# Patient Record
Sex: MALE | Race: WHITE | NOT HISPANIC OR LATINO
[De-identification: names, ages, dates, MRNs, and addresses within clinical notes are randomized per-mention and may not be internally consistent; named-entity substitution may affect disease eponyms.]

---

## 2023-01-04 ENCOUNTER — APPOINTMENT (OUTPATIENT)
Dept: UROLOGY | Facility: CLINIC | Age: 68
End: 2023-01-04
Payer: MEDICARE

## 2023-01-04 VITALS
BODY MASS INDEX: 38.3 KG/M2 | WEIGHT: 244 LBS | DIASTOLIC BLOOD PRESSURE: 111 MMHG | HEIGHT: 67 IN | HEART RATE: 85 BPM | SYSTOLIC BLOOD PRESSURE: 156 MMHG

## 2023-01-04 DIAGNOSIS — N48.0 LEUKOPLAKIA OF PENIS: ICD-10-CM

## 2023-01-04 DIAGNOSIS — N47.1 PHIMOSIS: ICD-10-CM

## 2023-01-04 PROBLEM — Z00.00 ENCOUNTER FOR PREVENTIVE HEALTH EXAMINATION: Status: ACTIVE | Noted: 2023-01-04

## 2023-01-04 PROCEDURE — 99204 OFFICE O/P NEW MOD 45 MIN: CPT

## 2023-01-04 NOTE — HISTORY OF PRESENT ILLNESS
[FreeTextEntry1] : Language: English\par Accompanied by: Self\par Contact info:  [phone]\par Referring Provider/PCP:  Dr. YOSEF Vega\par \par \par Initial H&P 01/04/2023:\par Mr. Simental is a very pleasant 67-year-old gentleman who presents today for initial evaluation of phimosis.\par \par He noticed tightening of the foreskin a few months ago, and it has been progressively getting tighter ever since.  Currently, he is unable to pull it back at all.  He has not had any infections as a result of it, however he does have soreness every once in a while.\par \par He denies spraying of his urine, however he does have a mildly weak stream.  It does not feel like it is being obstructed at the tip where the foreskin is tight.  He voids 1 time per night.  He denies hesitancy, intermittency, incomplete emptying, frequency, urgency, or gross hematuria.  He has never seen a urologist before.\par \par He has some issues with erections, and does not think they get from enough for penetration, but he is currently not interested in treatment as he is not sexually active.\par \par Of note, he has sinus issues and had a nosebleed recently, which was very prolonged and required urgent care visit (2/2 plavix), however resolved on its own once he arrived at urgent care.  Will address with his cardiologist next week. \par ---------------------------------------------------------------------------------------------------------------------------------------\par PMHx: CAD, Mild Obesity, Hypertension, hyperlipidemia, history of MI (~2015) s/p PCI w/ Stents\par PSHx: PCI w/ Stent (8y ago) still on Plavix, L TKR  \par SHx: Denies x3, retired - used to do various jobs within construction (plumbing, electric, carpentry, etc.), \par FHx: No known FHx  malignancy\par \par All: NKDA\par \par All non-urologic issues on ROS being addressed by PMD and caridologist.\par ---------------------------------------------------------------------------------------------------------------------------------------\par Physical Exam:\par General: NAD, sitting on exam table comfortably\par HEENT: NCAT, EOMI\par Resp: breathing comfortably on RA, b/l symm chest rise\par Cardiac: RRR\par Abd: SNTND\par Back: (-) CVAT b/l\par MSK: ART w/ FROM\par Psych: appropriate affect\par : uncircumcised phallus with very tight foreskin c/w phimosis, pale discoloration of foreskin c/f LS, b/l desc testes with no palpable masses or lesions\par \par ---------------------------------------------------------------------------------------------------------------------------------------\par Results:\par \par ---------------------------------------------------------------------------------------------------------------------------------------\par A/P: 67-year-old male with 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 Phimosis\par We discussed that the findings on his physical exam are consistent with lichen sclerosis.  I explained to him that this is an inflammatory condition that oftentimes causes phimosis and urethral stricture disease.  It has an  ascending pattern to it, meaning that it starts distally at the foreskin and then travels upwards to his glans, meatus, and the rest of his urethra.  If not addressed, it can progressively worsen and lead to urethral stricture disease.  To start, I will prescribe him triamcinolone cream twice daily for 6 weeks.  He was instructed to stretch his foreskin multiple times throughout the day including in the shower when the skin is soft, and as he applies the cream.  If the triamcinolone cream does not help, he will need to undergo circumcision.  We also discussed that if the triamcinolone cream works, and the phimosis resolves, it may recur again in the future and at that point, he would also need a circumcision.\par \par 2. BPH\par Although very mild, his weak stream is a sign of BPH.  We discussed that a majority of men at his age have a diagnosis of BPH, and the first-line treatment I recommend is tamsulosin.  At this time, he is not bothered enough by his 1 symptom to start treatment, however he understands that if it becomes worse, or if he changes his mind, he has many options at his disposal.  Additionally, he understands that BPH with obstructive symptoms can sometimes put him at risk for urinary tract infections, stone formation, and acute urinary retention.\par \par Additionally, he is going for knee surgery in the next few weeks at [facility].  I informed him that he should make the surgeon and nursing staff aware of his phimosis, although he likely will not require catheter placement at the time of his knee replacement.  He does understand, however, that if his procedure is done under spinal, he is at risk for urinary retention due to his BPH, and catheter placement may be more difficult because of the phimosis.  He does not require any urologic interventions prior to his orthopedic procedure, as a catheter may still be placed in the setting of phimosis.\par \par 3.  Erectile dysfunction\par He likely has erectile dysfunction due to a combination of vascular disease as well as his current blood pressure medication regimen.  At this time, he is not interested in treating his erectile dysfunction, however I did discuss with him that if he is interested in treating in the future, there are multiple options available to him, and I will be more than happy to address as needed. \par \par I will see him again in 6 weeks to reevaluate the phimosis.\par \par Plan:\par - Start Triamcinolone BID\par - RTC 6 weeks or sooner PRN

## 2023-01-04 NOTE — REVIEW OF SYSTEMS
[see HPI] : see HPI [Difficulty Walking] : difficulty walking [Easy Bleeding] : a tendency for easy bleeding [Negative] : Endocrine

## 2023-02-13 ENCOUNTER — APPOINTMENT (OUTPATIENT)
Dept: UROLOGY | Facility: CLINIC | Age: 68
End: 2023-02-13
Payer: MEDICARE

## 2023-02-13 VITALS
HEIGHT: 67 IN | SYSTOLIC BLOOD PRESSURE: 148 MMHG | DIASTOLIC BLOOD PRESSURE: 82 MMHG | BODY MASS INDEX: 37.98 KG/M2 | WEIGHT: 242 LBS

## 2023-02-13 PROCEDURE — 99214 OFFICE O/P EST MOD 30 MIN: CPT

## 2023-02-13 NOTE — HISTORY OF PRESENT ILLNESS
[FreeTextEntry1] : Language: English\par Accompanied by: Self\par Contact info:  [phone]\par Referring Provider/PCP:  Dr. YOSEF Vega\par \par \par Initial H&P 01/04/2023:\par Mr. Simental is a very pleasant 67-year-old gentleman who presents today for initial evaluation of phimosis.\par \par He noticed tightening of the foreskin a few months ago, and it has been progressively getting tighter ever since.  Currently, he is unable to pull it back at all.  He has not had any infections as a result of it, however he does have soreness every once in a while.\par \par He denies spraying of his urine, however he does have a mildly weak stream.  It does not feel like it is being obstructed at the tip where the foreskin is tight.  He voids 1 time per night.  He denies hesitancy, intermittency, incomplete emptying, frequency, urgency, or gross hematuria.  He has never seen a urologist before.\par \par He has some issues with erections, and does not think they get from enough for penetration, but he is currently not interested in treatment as he is not sexually active.\par \par Of note, he has sinus issues and had a nosebleed recently, which was very prolonged and required urgent care visit (2/2 plavix), however resolved on its own once he arrived at urgent care.  Will address with his cardiologist next week. \par \par Interval History 02/13/2023:\aliyah Presents today for f/u. Last seen clinic 01/04/2023.\par \aliyah Was prescribed triamcinolone cream for his phimosis.  States that he has not seen a difference. \par \par Opted for observation WRT to his BPH and ED.  \par \par No interval changes in health.  Has not scheduled his planned knee surgery yet.  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[employer], electric, [employer] etc.), \par FHx: No known FHx  malignancy\par \par All: NKDA\par \par All non-urologic issues on ROS being addressed by PMD and caridologist.\par ---------------------------------------------------------------------------------------------------------------------------------------\par Physical Exam:\par General: NAD, sitting on exam table comfortably\par HEENT: NCAT, EOMI\par Resp: breathing comfortably on RA, b/l symm chest rise\par Cardiac: RRR\par Abd: SNTND\par Back: (-) CVAT b/l\par MSK: CORDOVA w/ FROM\par Psych: appropriate affect\par : uncircumcised phallus with very tight foreskin c/w phimosis, pale discoloration of foreskin c/f LS, b/l desc testes with no palpable masses or lesions\par \par  (2/13/23): uncircumcised phallus with very tight foreskin c/w phimosis, prominent SP fat pad, exam unchanged from prior\par \par ---------------------------------------------------------------------------------------------------------------------------------------\par Results:\par \par ---------------------------------------------------------------------------------------------------------------------------------------\par A/P: 67-year-old male with 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 Phimosis\par After 6 weeks of steroid cream, his phimosis has not resolved.  We discussed that the neck step would be surgery.  I offered him to possible procedures: Circumcision vs Dorsal Slit procedure.  Given the possibility of lichen sclerosus, I would lean more towards circumcision.  We discussed that my only issue with circumcision is his prominent suprapubic fat pad could potentially complicate his circumcision and lead to a buried penis.  This would significantly affect his quality of life, and would require a more extensive reconstructive operation for revision.  By performing a dorsal slit, we would provide an adequate opening for his glans to allow for a more normal voiding pattern (if prostate not affecting it as well), however he would not remove the lichenoid skin.  At the time of the dorsal slit, I would biopsy the foreskin to confirm or rule out presence of lichen sclerosis, and if needed, a full circumcision could be performed at a later date.\par \par Patient would prefer a full circumcision.  Additional risks were discussed, including but not limited to bleeding, infection, damage to surrounding structures, wound dehiscence, hematoma formation, prolonged pain, and prolonged swelling.  He would need to hold Plavix for 7 days prior to surgery.\par \par Over the next 2 months, the patient has plans and family events, and he would like to hold off on surgery until May.\par \par He will see me at the end of April to discuss surgery once again and scheduling.\par \par He will also see his cardiologist in the interim and discuss surgical risks as well as holding Plavix.\par \par 2. BPH\par He continues to be only minimally bothered by his BPH, and therefore would like to hold off on tamsulosin.  We also discussed the possibility of postoperative retention following his knee surgery.  He informed me that he has not scheduled the knee surgery yet, and is holding off indefinitely for now.\par \par 3.  Erectile dysfunction\par Currently not interested in treating his erectile dysfunction.  He will let me know in the future if anything changes.\par \par I will see him again in April to discuss surgery\par \par Plan:\par - RTC April or sooner PRN

## 2023-04-17 ENCOUNTER — APPOINTMENT (OUTPATIENT)
Dept: UROLOGY | Facility: CLINIC | Age: 68
End: 2023-04-17
Payer: MEDICARE

## 2023-04-17 VITALS
HEIGHT: 67 IN | BODY MASS INDEX: 37.98 KG/M2 | SYSTOLIC BLOOD PRESSURE: 138 MMHG | WEIGHT: 242 LBS | DIASTOLIC BLOOD PRESSURE: 82 MMHG

## 2023-04-17 PROCEDURE — 99214 OFFICE O/P EST MOD 30 MIN: CPT

## 2023-04-17 NOTE — HISTORY OF PRESENT ILLNESS
[FreeTextEntry1] : Language: English\par Accompanied by: Self\par Contact info:  [phone]\par Referring Provider/PCP:  Dr. YOSEF Vega\par \par Initial H&P 01/04/2023:\par Mr. Simental is a very pleasant 67-year-old gentleman who presents today for initial evaluation of phimosis.\par \par He noticed tightening of the foreskin a few months ago, and it has been progressively getting tighter ever since.  Currently, he is unable to pull it back at all.  He has not had any infections as a result of it, however he does have soreness every once in a while.\par \par He denies spraying of his urine, however he does have a mildly weak stream.  It does not feel like it is being obstructed at the tip where the foreskin is tight.  He voids 1 time per night.  He denies hesitancy, intermittency, incomplete emptying, frequency, urgency, or gross hematuria.  He has never seen a urologist before.\par \par He has some issues with erections, and does not think they get from enough for penetration, but he is currently not interested in treatment as he is not sexually active.\par \par Of note, he has sinus issues and had a nosebleed recently, which was very prolonged and required urgent care visit (2/2 plavix), however resolved on its own once he arrived at urgent care.  Will address with his cardiologist next week. \par -------------------------------------------------------------------------------------------------------\par Interval History 04/17/2023:todd Presents today for f/u. Last seen clinic 02/13/2023:\aliyah brooks Was previously prescribed triamcinolone cream for his phimosis, and at his last visit, stated that he had not seen a difference. We discussed circumcision at his last visit, and he wanted to hold off until May due to upcoming family events.  Plan was for him to come in today to discuss circ again and schedule. \par \par Opted for observation WRT to his BPH and ED.  \par \par No interval changes in health.  Saw his cardiologist recently and is now off Plavix.  \par ---------------------------------------------------------------------------------------------------------------------------------------\par PMHx: CAD, Mild Obesity, Hypertension, hyperlipidemia, history of MI (~2015) s/p PCI w/ Stents\par PSHx: PCI w/ Stent (8y ago) still on Plavix, L TKR  \par SHx: Denies x3, retired - used to do various jobs within construction (plumbing, electric, carpentry, etc.), \par FHx: No known FHx  malignancy\par \par All: NKDA\par \par All non-urologic issues on ROS being addressed by PMD and caridologist.\par ---------------------------------------------------------------------------------------------------------------------------------------\par Physical Exam:\par General: NAD, sitting on exam table comfortably\par HEENT: NCAT, EOMI\par Resp: breathing comfortably on RA, b/l symm chest rise\par Cardiac: RRR\par Abd: SNTND\par Back: (-) CVAT b/l\par MSK: CORDOVA w/ FROM\par Psych: appropriate affect\par : uncircumcised phallus with very tight foreskin c/w phimosis, pale discoloration of foreskin c/f LS, b/l desc testes with no palpable masses or lesions\par \par  (2/13/23): uncircumcised phallus with very tight foreskin c/w phimosis, prominent SP fat pad, exam unchanged from prior\par \par ---------------------------------------------------------------------------------------------------------------------------------------\par Results:\par \par ---------------------------------------------------------------------------------------------------------------------------------------\par A/P: 67-year-old male with 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 Phimosis\par After 6 weeks of steroid cream, his phimosis has not resolved. \par \par I offered him two possible procedures: Circumcision vs Dorsal Slit procedure.  Given the possibility of lichen sclerosus, I would lean more towards circumcision.  We discussed that my only issue with circumcision is his prominent suprapubic fat pad could potentially complicate his circumcision and lead to a buried penis.  This would significantly affect his quality of life, and would require a more extensive reconstructive operation for revision.  By performing a dorsal slit, we would provide an adequate opening for his glans to allow for a more normal voiding pattern (if prostate not affecting it as well), however he would not remove the lichenoid skin.  At the time of the dorsal slit, I would biopsy the foreskin to confirm or rule out presence of lichen sclerosis, and if needed, a full circumcision could be performed at a later date.\par \par Patient would prefer a full circumcision.  Additional risks were discussed, including but not limited to bleeding, infection, damage to surrounding structures, wound dehiscence, hematoma formation, prolonged pain, and prolonged swelling. \par \par He wishes to proceed. Prefers 5/23/23 date. \par \par 2. BPH\par He continues to be only minimally bothered by his BPH, and therefore would like to hold off on tamsulosin.  We also discussed the possibility of postoperative retention following his knee surgery.  He informed me that he has not scheduled the knee surgery yet, and is holding off indefinitely for now.\par \par 3.  Erectile dysfunction\par Currently not interested in treating his erectile dysfunction.  He will let me know in the future if anything changes.\par \par \par Plan:\par - OR for circ

## 2023-04-21 ENCOUNTER — APPOINTMENT (OUTPATIENT)
Dept: UROLOGY | Facility: CLINIC | Age: 68
End: 2023-04-21

## 2023-05-18 ENCOUNTER — NON-APPOINTMENT (OUTPATIENT)
Age: 68
End: 2023-05-18

## 2023-05-18 ENCOUNTER — APPOINTMENT (OUTPATIENT)
Dept: HEART AND VASCULAR | Facility: CLINIC | Age: 68
End: 2023-05-18
Payer: MEDICARE

## 2023-05-18 VITALS
HEART RATE: 80 BPM | BODY MASS INDEX: 38.61 KG/M2 | SYSTOLIC BLOOD PRESSURE: 140 MMHG | HEIGHT: 67 IN | WEIGHT: 246 LBS | DIASTOLIC BLOOD PRESSURE: 90 MMHG | RESPIRATION RATE: 12 BRPM

## 2023-05-18 DIAGNOSIS — Z98.61 ATHEROSCLEROTIC HEART DISEASE OF NATIVE CORONARY ARTERY W/OUT ANGINA PECTORIS: ICD-10-CM

## 2023-05-18 DIAGNOSIS — Z01.810 ENCOUNTER FOR PREPROCEDURAL CARDIOVASCULAR EXAMINATION: ICD-10-CM

## 2023-05-18 DIAGNOSIS — Z78.9 OTHER SPECIFIED HEALTH STATUS: ICD-10-CM

## 2023-05-18 DIAGNOSIS — I25.10 ATHEROSCLEROTIC HEART DISEASE OF NATIVE CORONARY ARTERY W/OUT ANGINA PECTORIS: ICD-10-CM

## 2023-05-18 PROCEDURE — 99204 OFFICE O/P NEW MOD 45 MIN: CPT

## 2023-05-18 PROCEDURE — 93000 ELECTROCARDIOGRAM COMPLETE: CPT

## 2023-05-18 RX ORDER — ATORVASTATIN CALCIUM 80 MG/1
80 TABLET, FILM COATED ORAL
Refills: 0 | Status: ACTIVE | COMMUNITY

## 2023-05-18 RX ORDER — METOPROLOL SUCCINATE 50 MG/1
50 TABLET, EXTENDED RELEASE ORAL
Refills: 0 | Status: ACTIVE | COMMUNITY

## 2023-05-18 RX ORDER — LISINOPRIL 10 MG/1
10 TABLET ORAL
Refills: 0 | Status: ACTIVE | COMMUNITY

## 2023-05-18 RX ORDER — ASPIRIN 81 MG
81 TABLET, DELAYED RELEASE (ENTERIC COATED) ORAL
Refills: 0 | Status: ACTIVE | COMMUNITY

## 2023-05-18 RX ORDER — EZETIMIBE 10 MG/1
10 TABLET ORAL
Refills: 0 | Status: ACTIVE | COMMUNITY

## 2023-05-18 NOTE — DISCUSSION/SUMMARY
[Procedure Low Risk] : the procedure risk is low [Patient Intermediate Risk] : the patient is an intermediate risk [Optimized for Surgery] : the patient is optimized for surgery [As per surgery] : as per surgery [Continue] : Continue medications as currently directed [FreeTextEntry1] : 1.  Preop clearance for elective circumcision secondary to phimosis: Patient is intermediate risk for low risk procedure does greater than 4 METS of daily activity without chest pain shortness of breath PND orthopnea.  Reviewed recent echocardiogram will obtain preop blood work and EKG.  Patient is optimized for planned procedure and may proceed as planned.  Continue perioperative beta-blocker\par 2.  CAD: Continue current meds.

## 2023-05-18 NOTE — HISTORY OF PRESENT ILLNESS
[Preoperative Visit] : for a medical evaluation prior to surgery [Scheduled Procedure ___] : a [unfilled] [Chest Pain] : no chest pain [Dyspnea] : no dyspnea [Lower Extremity Swelling] : no lower extremity swelling [Metabolic Capacity ___Mets%] : The patient has a metabolic capacity of [unfilled] Mets%  [Good] : Good [FreeTextEntry1] : 67-year-old male with a past medical history of CAD status post multivessel PCI in the setting of an MI in 2013.  Since then patient has been feeling well denies chest pain shortness of breath PND orthopnea does greater than 4 METS of daily activity without symptoms.  Underwent a echocardiogram in March 2023 which revealed an ejection fraction of 44% mild valvular heart disease.

## 2023-05-18 NOTE — PHYSICAL EXAM
[General Appearance - Well Developed] : well developed [Normal Appearance] : normal appearance [Well Groomed] : well groomed [General Appearance - Well Nourished] : well nourished [No Deformities] : no deformities [General Appearance - In No Acute Distress] : no acute distress [Normal Oral Mucosa] : normal oral mucosa [No Oral Pallor] : no oral pallor [No Oral Cyanosis] : no oral cyanosis [Normal Jugular Venous A Waves Present] : normal jugular venous A waves present [Normal Jugular Venous V Waves Present] : normal jugular venous V waves present [No Jugular Venous Rizzo A Waves] : no jugular venous rizzo A waves [Respiration, Rhythm And Depth] : normal respiratory rhythm and effort [Exaggerated Use Of Accessory Muscles For Inspiration] : no accessory muscle use [Auscultation Breath Sounds / Voice Sounds] : lungs were clear to auscultation bilaterally [Heart Rate And Rhythm] : heart rate and rhythm were normal [Heart Sounds] : normal S1 and S2 [Arterial Pulses Normal] : the arterial pulses were normal [Edema] : no peripheral edema present [Abdomen Soft] : soft [Abdomen Tenderness] : non-tender [Abdomen Mass (___ Cm)] : no abdominal mass palpated [Abnormal Walk] : normal gait [Gait - Sufficient For Exercise Testing] : the gait was sufficient for exercise testing [Nail Clubbing] : no clubbing of the fingernails [Cyanosis, Localized] : no localized cyanosis [Petechial Hemorrhages (___cm)] : no petechial hemorrhages [] : no ischemic changes [Oriented To Time, Place, And Person] : oriented to person, place, and time [Affect] : the affect was normal [Mood] : the mood was normal [No Anxiety] : not feeling anxious

## 2023-05-19 LAB
ALBUMIN SERPL ELPH-MCNC: 4.5 G/DL
ALP BLD-CCNC: 91 U/L
ALT SERPL-CCNC: 24 U/L
ANION GAP SERPL CALC-SCNC: 11 MMOL/L
APTT BLD: 31.2 SEC
AST SERPL-CCNC: 23 U/L
BILIRUB SERPL-MCNC: 0.9 MG/DL
BUN SERPL-MCNC: 20 MG/DL
CALCIUM SERPL-MCNC: 10.2 MG/DL
CHLORIDE SERPL-SCNC: 105 MMOL/L
CO2 SERPL-SCNC: 27 MMOL/L
CREAT SERPL-MCNC: 1.07 MG/DL
EGFR: 77 ML/MIN/1.73M2
GLUCOSE SERPL-MCNC: 99 MG/DL
INR PPP: 1.03 RATIO
POTASSIUM SERPL-SCNC: 4.8 MMOL/L
PROT SERPL-MCNC: 7.3 G/DL
PT BLD: 12 SEC
SODIUM SERPL-SCNC: 143 MMOL/L

## 2023-05-22 NOTE — ASU PATIENT PROFILE, ADULT - NSICDXPASTMEDICALHX_GEN_ALL_CORE_FT
PAST MEDICAL HISTORY:  Elevated cholesterol     History of heart attack 10 yrs ago    HTN (hypertension)

## 2023-05-22 NOTE — ASU PATIENT PROFILE, ADULT - NSICDXPASTSURGICALHX_GEN_ALL_CORE_FT
PAST SURGICAL HISTORY:  History of left knee replacement     S/P coronary artery stent placement x4 10 yrs ago

## 2023-05-22 NOTE — ASU PATIENT PROFILE, ADULT - NS PREOP UNDERSTANDS INFO
no solids after 10pm, clears allowed up to 6am, bring ID and insurance card, no valuables or jewelry, wear comfortable clothing../yes No solid food/candy/dairy/gum after midnight Monday, water before 09:30am Tuesday, Patient to come with photo ID/Insurance card, dress in comfortable clothes, no jewelries/valuables/contact lens, no smoking/alcohol/recreational drug use on Monday, address and callback number was given./yes

## 2023-05-22 NOTE — ASU PATIENT PROFILE, ADULT - FALL HARM RISK - UNIVERSAL INTERVENTIONS
Bed in lowest position, wheels locked, appropriate side rails in place/Call bell, personal items and telephone in reach/Instruct patient to call for assistance before getting out of bed or chair/Non-slip footwear when patient is out of bed/[place] to call system/Physically safe environment - no spills, clutter or unnecessary equipment/Purposeful Proactive Rounding/Room/bathroom lighting operational, light cord in reach

## 2023-05-23 ENCOUNTER — APPOINTMENT (OUTPATIENT)
Dept: UROLOGY | Facility: AMBULATORY SURGERY CENTER | Age: 68
End: 2023-05-23

## 2023-05-25 PROBLEM — I25.2 OLD MYOCARDIAL INFARCTION: Chronic | Status: ACTIVE | Noted: 2023-05-22

## 2023-05-25 PROBLEM — E78.00 PURE HYPERCHOLESTEROLEMIA, UNSPECIFIED: Chronic | Status: ACTIVE | Noted: 2023-05-22

## 2023-05-29 ENCOUNTER — TRANSCRIPTION ENCOUNTER (OUTPATIENT)
Age: 68
End: 2023-05-29

## 2023-05-30 ENCOUNTER — TRANSCRIPTION ENCOUNTER (OUTPATIENT)
Age: 68
End: 2023-05-30

## 2023-05-30 ENCOUNTER — RESULT REVIEW (OUTPATIENT)
Age: 68
End: 2023-05-30

## 2023-05-30 ENCOUNTER — OUTPATIENT (OUTPATIENT)
Dept: OUTPATIENT SERVICES | Facility: HOSPITAL | Age: 68
LOS: 1 days | Discharge: HOME | End: 2023-05-30
Payer: MEDICARE

## 2023-05-30 ENCOUNTER — APPOINTMENT (OUTPATIENT)
Dept: UROLOGY | Facility: AMBULATORY SURGERY CENTER | Age: 68
End: 2023-05-30

## 2023-05-30 VITALS
HEIGHT: 67 IN | TEMPERATURE: 98 F | HEART RATE: 72 BPM | RESPIRATION RATE: 17 BRPM | OXYGEN SATURATION: 95 % | WEIGHT: 242.51 LBS | SYSTOLIC BLOOD PRESSURE: 139 MMHG | DIASTOLIC BLOOD PRESSURE: 92 MMHG

## 2023-05-30 VITALS
TEMPERATURE: 97 F | OXYGEN SATURATION: 97 % | RESPIRATION RATE: 16 BRPM | HEART RATE: 75 BPM | SYSTOLIC BLOOD PRESSURE: 126 MMHG | DIASTOLIC BLOOD PRESSURE: 82 MMHG

## 2023-05-30 DIAGNOSIS — Z95.5 PRESENCE OF CORONARY ANGIOPLASTY IMPLANT AND GRAFT: Chronic | ICD-10-CM

## 2023-05-30 DIAGNOSIS — Z96.652 PRESENCE OF LEFT ARTIFICIAL KNEE JOINT: Chronic | ICD-10-CM

## 2023-05-30 PROCEDURE — 88304 TISSUE EXAM BY PATHOLOGIST: CPT | Mod: 26

## 2023-05-30 PROCEDURE — 54161 CIRCUM 28 DAYS OR OLDER: CPT

## 2023-05-30 RX ORDER — METOPROLOL TARTRATE 50 MG
1 TABLET ORAL
Refills: 0 | DISCHARGE

## 2023-05-30 RX ORDER — SODIUM CHLORIDE 9 MG/ML
1000 INJECTION, SOLUTION INTRAVENOUS
Refills: 0 | Status: DISCONTINUED | OUTPATIENT
Start: 2023-05-30 | End: 2023-05-30

## 2023-05-30 RX ORDER — ACETAMINOPHEN 500 MG
1000 TABLET ORAL ONCE
Refills: 0 | Status: COMPLETED | OUTPATIENT
Start: 2023-05-30 | End: 2023-05-30

## 2023-05-30 RX ORDER — ATORVASTATIN CALCIUM 80 MG/1
1 TABLET, FILM COATED ORAL
Refills: 0 | DISCHARGE

## 2023-05-30 RX ORDER — LISINOPRIL 2.5 MG/1
1 TABLET ORAL
Refills: 0 | DISCHARGE

## 2023-05-30 RX ORDER — CELECOXIB 200 MG/1
1 CAPSULE ORAL
Qty: 14 | Refills: 0
Start: 2023-05-30 | End: 2023-06-05

## 2023-05-30 RX ORDER — FENTANYL CITRATE 50 UG/ML
25 INJECTION INTRAVENOUS
Refills: 0 | Status: DISCONTINUED | OUTPATIENT
Start: 2023-05-30 | End: 2023-05-30

## 2023-05-30 RX ORDER — EZETIMIBE 10 MG/1
1 TABLET ORAL
Refills: 0 | DISCHARGE

## 2023-05-30 RX ORDER — ASPIRIN/CALCIUM CARB/MAGNESIUM 324 MG
1 TABLET ORAL
Refills: 0 | DISCHARGE

## 2023-05-30 RX ORDER — ONDANSETRON 8 MG/1
4 TABLET, FILM COATED ORAL ONCE
Refills: 0 | Status: DISCONTINUED | OUTPATIENT
Start: 2023-05-30 | End: 2023-05-30

## 2023-05-30 RX ADMIN — Medication 1000 MILLIGRAM(S): at 11:37

## 2023-05-30 NOTE — ASU DISCHARGE PLAN (ADULT/PEDIATRIC) - ASU DC SPECIAL INSTRUCTIONSFT
CIRCUMCISION    STITCHES: The stitches in the incision will dissolve and fall out by themselves. Sometimes skin stitches may open, allowing a slight gaping of the incision. This is no problem if you keep the area clean. You may apply Bacitracin (available over the counter) to the incision 3 times a day for the first week. Keep your penis wrapped in the dressing for 21 hours on, 3 hours off, each day. Any “black and blue” bruising areas are expected and will also resolve over weeks.  You may apply an ice-pack for 15 minutes out of every hour for the first 24 -36 hours to minimize pain and swelling.    PAIN: You may have some intermittent pain for up to six (6) weeks post operatively. Pain does not signify any problem unless associated with fever, chills, or inability to void.  If you experience any fevers or chills please call immediately as this may be signs of an infection. You may take Tylenol (acetaminophen) 1000mg available over the counter, for pain every 6 hours as needed. Do not exceed 4000mg of Tylenol (acetaminophen) daily. You may also take Celebrex 100mg twice daily - for your convenience, all prescriptions are sent to [facility] pharmacy at [address] [place] [address], on the corner of [address] and [address] [place] [address].    STOOL SOFTENERS: Do not allow yourself to become constipated as straining may cause bleeding. Take stool softeners or a laxative (ex. Miralax, Colace, Senokot, ExLax, etc), available over the counter, if taking Percocet.    ANTICOAGULATION: If you are taking any blood thinning medications, please discuss with your urologist prior to restarting these medications unless otherwise specified.    BATHING: You may sponge bath 24 hours after surgery, but minimize water to the surgical incision and drain.    DIET: You may resume your regular diet and regular medication regimen.    ACTIVITY: No heavy lifting or strenuous exercise until you are evaluated at your post-operative appointment. Otherwise, you may return to your usual level of physical activity.    FOLLOW-UP: Please follow up with Dr. Hilton. If you did not already schedule your post-operative appointment, please call your urologist to schedule and follow-up appointment.    CALL YOUR UROLOGIST IF: You have any bleeding that does not stop, inability to void >8 hours, fever over 100.4 F, chills, persistent nausea/vomiting, changes in your incision concerning for infection, or if your pain is not controlled on your discharge pain medications.
Detail Level: Detailed
Plan: This patient has been treated today with image guided superficial radiation therapy for non-melanoma skin cancer.\\n\\nWritten informed consent has been previously obtained from this patient for this treatment. This consent is documented in the patient’s chart. The patient gave verbal consent to continue treatment today.\\n\\nThe patient was treated with a specific radiation dose and setup as prescribed by the provider listed on this visit note. A Radiation Therapist performed administration of radiation under supervision of provider. The treatment parameters and cumulative dose are indicated above.\\n\\nPrior to administering the radiation, the patient underwent a verification therapeutic radiology simulation-aided field setting defining relevant normal and abnormal target anatomy and acquiring images with high frequency ultrasound in addition to data necessary developing optimal radiation treatment process for the patient. This process includes verification of the treatment port(s) and proper treatment positioning. All treatment ports were photographed within electronic medical record. The patient’s customized lead blocking along with gross tumor volume and margin was confirmed. Considering superficial radiotherapy is clinical in setup, this requires physician and radiation therapist to clarify location interest being treated against initial images, pathology and patient anatomy. Care was taken ensuring fields treated were geometrically accurate and properly positioned using therapeutic radiology simulation-aided field setting verification per fraction. This process is also utilized to determine if any prescription or setup changes are necessary. These steps are therefore medically necessary ensuring safe and effective administration of radiation. Ongoing therapeutic radiology simulation-aided field setting verification is ordered throughout course of therapy.\\n\\nA high frequency ultrasound image was acquired today for two-dimensional evaluation of the tumor volume and response to treatment, in addition to geometric accuracy of field placement. US depth is 2.41 mm, which is 0.58 mm in difference from previous imaging. The field placement and ultrasound imaging, per fraction, is separate and distinct from the initial simulation, and is an important task in providing safe administration of superficial radiation therapy. Physician evaluation of the ultrasound tumor depth will be ongoing throughout the course of treatment and is deemed medically necessary in order to ensure the efficacy of treatment and any necessary changes. Today’s image was evaluated for determination of continuation of treatment with the current plan or with necessary changes as appropriate. According to provider review of verification, therapeutic radiology simulation-aided field setting and imaging, no change is required. Additionally the use of ultrasound visualization and targeted assessment allows the patient to be able to see their cancer(s) progress, encouraging patient to complete and maintain compliance through full course of radiotherapy.\\n\\nPer Dr. Chen, continued ultrasound guidance and therapeutic radiology simulation-aided field setting verification per fraction is required for field placement, measurement of tumor depth, progress and acute effect monitoring.

## 2023-05-30 NOTE — ASU DISCHARGE PLAN (ADULT/PEDIATRIC) - CARE PROVIDER_API CALL
Paul Hilton  Urology  [address] [place] [address]  Phone: [phone]  Fax: [phone]  Follow Up Time:

## 2023-05-31 ENCOUNTER — NON-APPOINTMENT (OUTPATIENT)
Age: 68
End: 2023-05-31

## 2023-05-31 PROBLEM — I10 ESSENTIAL (PRIMARY) HYPERTENSION: Chronic | Status: ACTIVE | Noted: 2023-05-22

## 2023-06-05 ENCOUNTER — NON-APPOINTMENT (OUTPATIENT)
Age: 68
End: 2023-06-05

## 2023-06-14 ENCOUNTER — APPOINTMENT (OUTPATIENT)
Dept: UROLOGY | Facility: CLINIC | Age: 68
End: 2023-06-14
Payer: COMMERCIAL

## 2023-06-14 VITALS
SYSTOLIC BLOOD PRESSURE: 160 MMHG | HEART RATE: 76 BPM | DIASTOLIC BLOOD PRESSURE: 100 MMHG | BODY MASS INDEX: 37.2 KG/M2 | WEIGHT: 237 LBS | OXYGEN SATURATION: 7 % | HEIGHT: 67 IN

## 2023-06-14 PROCEDURE — 99213 OFFICE O/P EST LOW 20 MIN: CPT

## 2023-06-21 LAB — SURGICAL PATHOLOGY STUDY: SIGNIFICANT CHANGE UP

## 2023-07-03 NOTE — HISTORY OF PRESENT ILLNESS
[FreeTextEntry1] : Language: English\par Accompanied by: Self\par Contact info:  [phone]\par Referring Provider/PCP:  Dr. YOSEF Vega\par \par Initial H&P 01/04/2023:\par Mr. Simental is a very pleasant 67-year-old gentleman who presents today for initial evaluation of phimosis.\par \par He noticed tightening of the foreskin a few months ago, and it has been progressively getting tighter ever since.  Currently, he is unable to pull it back at all.  He has not had any infections as a result of it, however he does have soreness every once in a while.\par \par He denies spraying of his urine, however he does have a mildly weak stream.  It does not feel like it is being obstructed at the tip where the foreskin is tight.  He voids 1 time per night.  He denies hesitancy, intermittency, incomplete emptying, frequency, urgency, or gross hematuria.  He has never seen a urologist before.\par \par He has some issues with erections, and does not think they get from enough for penetration, but he is currently not interested in treatment as he is not sexually active.\par \par Of note, he has sinus issues and had a nosebleed recently, which was very prolonged and required urgent care visit (2/2 plavix), however resolved on its own once he arrived at urgent care.  Will address with his cardiologist next week. \par -------------------------------------------------------------------------------------------------------\par 05/30/2023: s/p Circumcision\par Findings: extremely phimotic foreskin with lichenoid appearance, glans was pale/discolored c/w lichen sclerosis, meatus was stenotic as well, significant skin bridges/adhesions between the foreskin and glans\par -------------------------------------------------------------------------------------------------------\par Interval History 06/14/2023:todd Presents today for post op visit.  POD15 from procedure above. \par \par Overall doing well since surgery.  Does have significant penile swelling.  Wrapped penis for 1 week as previously instructed. Denies erythema/purulent drainage from surgical site.  Only other complaint is occasional spraying/feeling of pressure at the tip when he voids. \par \par He previously called and requested urgent f/u, however on the day of his planned visit, he felt reassured by our conversation and elected to f/u at his already scheduled visit today. \par \par ---------------------------------------------------------------------------------------------------------------------------------------\par PMHx: CAD, Mild Obesity, Hypertension, hyperlipidemia, history of MI (~2015) s/p PCI w/ Stents\par PSHx: PCI w/ Stent (8y ago) still on Plavix, L TKR  \par SHx: Denies x3, retired - used to do various jobs within construction (plumbing, electric, carpentry, etc.), \par FHx: No known FHx  malignancy\par \par All: NKDA\par \par All non-urologic issues on ROS being addressed by PMD and caridologist.\par ---------------------------------------------------------------------------------------------------------------------------------------\par Physical Exam:\par General: NAD, sitting on exam table comfortably\par HEENT: NCAT, EOMI\par Resp: breathing comfortably on RA, b/l symm chest rise\par Cardiac: RRR\par Abd: SNTND\par Back: (-) CVAT b/l\par MSK: CORDOVA w/ FROM\par Psych: appropriate affect\par : uncircumcised phallus with very tight foreskin c/w phimosis, pale discoloration of foreskin c/f LS, b/l desc testes with no palpable masses or lesions\par \par  (2/13/23): uncircumcised phallus with very tight foreskin c/w phimosis, prominent SP fat pad, exam unchanged from prior\par  (6/14/23): edematous shaft, circumcision site healing well, evidence of new skin bridge forming between the glans and the more proximal skin, very stenotic meatus, no erythema or purulent drainage appreciated. \par ---------------------------------------------------------------------------------------------------------------------------------------\par Results:\par \par ---------------------------------------------------------------------------------------------------------------------------------------\par A/P: 67-year-old male with phimosis secondary to lichen sclerosis, BPH, and erectile dysfunction. He is now POD15 from procedure above. \par \par 1. Phimosis\par I am overall pleased with the appearance of his surgical site and the healing process thus far.  I explained that the swelling he has, that is more concentrated in the distal shaft, is expected at this stage in the postoperative course.  He was previously instructed to stop bacitracin, and as a form of barrier cream, he may start using aquaphor to help facilitate healing and minimize scarring. \par \par He may have a new skin bridge forming along the lateral aspect of his glans, which will need to be reassessed when the edema has improved.  He was informed that by continuous application of the ointment, he will prevent the skin edges from coming into contact with one another, thereby decreasing the risk of adhesions/bridges. \par \par 2. Meatal Stenosis\par I discussed the intraoperative findings with him on POD1 (see chart note) as well as today. He understands that the meatal stenosis is a consequence of his lichen sclerosis, and it was not something that was able to be identified on his pre op visits, as the foreskin was completely occluding his meatus.  \par \par Now that his meatus is exposed, he will require additional workup prior to meatoplasty, in the form of a RUG.  I explained to him that based on his symptoms, he likely has disease in the meatus only, however one cannot determine this without an official RUG.  \par 3. BPH\par He continues to be only minimally bothered by his BPH, and therefore would like to hold off on tamsulosin.  \par \par 4.  Erectile dysfunction\par Currently not interested in treating his erectile dysfunction.  He will let me know in the future if anything changes.\par \par Al questions were answered on today's visit.  I will see him again in 4 weeks for reassessment and discussion of next steps. \par \par Plan:\par - cont ointment to surgical site 2-3x/day\par - RTC 4 weeks or sooner PRN

## 2023-07-10 ENCOUNTER — APPOINTMENT (OUTPATIENT)
Dept: UROLOGY | Facility: CLINIC | Age: 68
End: 2023-07-10

## 2023-07-14 ENCOUNTER — APPOINTMENT (OUTPATIENT)
Dept: UROLOGY | Facility: CLINIC | Age: 68
End: 2023-07-14
Payer: MEDICARE

## 2023-07-14 VITALS
RESPIRATION RATE: 12 BRPM | DIASTOLIC BLOOD PRESSURE: 90 MMHG | BODY MASS INDEX: 37.35 KG/M2 | WEIGHT: 238 LBS | HEIGHT: 67 IN | SYSTOLIC BLOOD PRESSURE: 140 MMHG

## 2023-07-14 PROCEDURE — 99213 OFFICE O/P EST LOW 20 MIN: CPT

## 2023-07-14 RX ORDER — BETAMETHASONE DIPROPIONATE 0.5 MG/G
0.05 CREAM TOPICAL TWICE DAILY
Qty: 1 | Refills: 1 | Status: ACTIVE | COMMUNITY
Start: 2023-07-14 | End: 1900-01-01

## 2023-07-14 NOTE — HISTORY OF PRESENT ILLNESS
[FreeTextEntry1] : Language: English\par Accompanied by: Self\par Contact info:  [phone]\par Referring Provider/PCP:  Dr. YOSEF Vega\par \par Initial H&P 01/04/2023:\par Mr. Simental is a very pleasant 67-year-old gentleman who presents today for initial evaluation of phimosis.\par \par He noticed tightening of the foreskin a few months ago, and it has been progressively getting tighter ever since.  Currently, he is unable to pull it back at all.  He has not had any infections as a result of it, however he does have soreness every once in a while.\par \par He denies spraying of his urine, however he does have a mildly weak stream.  It does not feel like it is being obstructed at the tip where the foreskin is tight.  He voids 1 time per night.  He denies hesitancy, intermittency, incomplete emptying, frequency, urgency, or gross hematuria.  He has never seen a urologist before.\par \par He has some issues with erections, and does not think they get from enough for penetration, but he is currently not interested in treatment as he is not sexually active.\par \par Of note, he has sinus issues and had a nosebleed recently, which was very prolonged and required urgent care visit (2/2 plavix), however resolved on its own once he arrived at urgent care.  Will address with his cardiologist next week. \par -------------------------------------------------------------------------------------------------------\par 05/30/2023: s/p Circumcision\par Findings: extremely phimotic foreskin with lichenoid appearance, glans was pale/discolored c/w lichen sclerosis, meatus was stenotic as well, significant skin bridges/adhesions between the foreskin and glans\par -------------------------------------------------------------------------------------------------------\par Interval History 07/14/2023:todd Presents today for post op visit.  Last seen in clinic on 06/14/2023. He is now 6 weeks out from above procedure. No interval changes in health.  \par \par At his last visit, he still had significant penile swelling.  Wrapped penis for 1 week as previously instructed. Denied erythema/purulent drainage from surgical site.  Only other complaint was occasional spraying/feeling of pressure at the tip when he voids. \par \par On exam, he had moderate edema, evidence of new skin bridge formation and meatal stenosis. \par \par Today, he states that his penis looks better.  States that left side is still a little "puffy/funky looking."  \par \par No longer having pain with voiding, but still experiencing spraying/crooked stream. \par ---------------------------------------------------------------------------------------------------------------------------------------\par PMHx: CAD, Mild Obesity, Hypertension, hyperlipidemia, history of MI (~2015) s/p PCI w/ Stents\par PSHx: PCI w/ Stent (8y ago) still on Plavix, L TKR  \par SHx: Denies x3, retired - used to do various jobs within construction (plumbing, electric, [employer], etc.), \par FHx: No known FHx  malignancy\par \par All: NKDA\par \par All non-urologic issues on ROS being addressed by PMD and caridologist.\par ---------------------------------------------------------------------------------------------------------------------------------------\par Physical Exam:\par General: NAD, sitting on exam table comfortably\par HEENT: NCAT, EOMI\par Resp: breathing comfortably on RA, b/l symm chest rise\par Cardiac: RRR\par Abd: SNTND\par Back: (-) CVAT b/l\par MSK: CORDOVA w/ FROM\par Psych: appropriate affect\par : uncircumcised phallus with very tight foreskin c/w phimosis, pale discoloration of foreskin c/f LS, b/l desc testes with no palpable masses or lesions\par \par  (2/13/23): uncircumcised phallus with very tight foreskin c/w phimosis, prominent SP fat pad, exam unchanged from prior\par  (6/14/23): edematous shaft, circumcision site healing well, evidence of new skin bridge forming between the glans and the more proximal skin, very stenotic meatus, no erythema or purulent drainage appreciated. \par  (7/14/23): edema about 90% resolved, prominent escutcheon pushing proximal penile skin more distally, circumcision site healing well, incision c/d/i, evidence of additional skin bridge forming between the glans and the more proximal skin on left and right sides, very stenotic meatus, no erythema or purulent drainage appreciated. \par ---------------------------------------------------------------------------------------------------------------------------------------\par Results:\par Pathology\par Foreskin 5/30/23 - Lichen\par ---------------------------------------------------------------------------------------------------------------------------------------\par A/P: 67-year-old male with phimosis secondary to lichen sclerosis, BPH, and erectile dysfunction. He is s/p circumcision 5/30/2023.  He has meatal stenosis that was first noted at the time of surgery.  Still bothered by the spraying.  \par \par Pathology results were discussed today.\par \par 1. Phimosis and Lichen Sclerosis\par Now s/p circ with edema mostly resolved.  We discussed that he has a very prominent SP fat pad that pushes the more proximal shaft skin distally, giving the penis a continued appearance of being uncircumcised. The glans is stil fully and easily exposed.  We discussed that the inflamed/irritated skin after surgery likely re-adhesed due to the SP fat pad, which caused the new skin bridge/adhesion formation. \par \par Overall, I am still very pleased with the progress he has made thus far in terms of healing.  I explained that in the future, the skin that is currently covering his glans may become scarred again, if the penis is hidden for prolonged periods of time, and if there is urine trapping underneath that would lead to further inflammation.  In order to decrease that risk, I will prescribe betamethatsone today, and will have him apply it to the glans and proximal skin for the next 4 weeks.  \par \par 2. Meatal Stenosis\par Will discuss RUG vs straight to OR for meatoplasty\par \par 3. BPH\par He continues to be only minimally bothered by his BPH, and therefore would like to hold off on tamsulosin.  \par \par 4.  Erectile dysfunction\par Currently not interested in treating his erectile dysfunction.  He will let me know in the future if anything changes.\par \par Al questions were answered on today's visit.  I will see him again in 6 weeks or sooner PRN\par \par Plan:\par - start betamethasone\par - RTC 6 weeks or sooner PRN

## 2023-08-25 ENCOUNTER — APPOINTMENT (OUTPATIENT)
Dept: UROLOGY | Facility: CLINIC | Age: 68
End: 2023-08-25

## 2023-09-06 NOTE — ASU PATIENT PROFILE, ADULT - PATIENT KNOW
Spoke to parent, who said that she meant to cancel but forgot. She will call back to reschedule patient.   yes

## 2023-09-08 ENCOUNTER — APPOINTMENT (OUTPATIENT)
Dept: UROLOGY | Facility: CLINIC | Age: 68
End: 2023-09-08
Payer: MEDICARE

## 2023-09-08 VITALS
DIASTOLIC BLOOD PRESSURE: 110 MMHG | HEIGHT: 67 IN | BODY MASS INDEX: 37.35 KG/M2 | SYSTOLIC BLOOD PRESSURE: 168 MMHG | WEIGHT: 238 LBS

## 2023-09-08 PROCEDURE — 99214 OFFICE O/P EST MOD 30 MIN: CPT

## 2023-09-08 NOTE — HISTORY OF PRESENT ILLNESS
[FreeTextEntry1] : Language: English\par  Accompanied by: Self\par  Contact info:  [phone]\par  Referring Provider/PCP:  Dr. YOSEF Vega\par  \par  Initial H&P 01/04/2023:\par  Mr. Simental is a very pleasant 67-year-old gentleman who presents today for initial evaluation of phimosis.\par  \par  He noticed tightening of the foreskin a few months ago, and it has been progressively getting tighter ever since.  Currently, he is unable to pull it back at all.  He has not had any infections as a result of it, however he does have soreness every once in a while.\par  \par  He denies spraying of his urine, however he does have a mildly weak stream.  It does not feel like it is being obstructed at the tip where the foreskin is tight.  He voids 1 time per night.  He denies hesitancy, intermittency, incomplete emptying, frequency, urgency, or gross hematuria.  He has never seen a urologist before.\par  \par  He has some issues with erections, and does not think they get from enough for penetration, but he is currently not interested in treatment as he is not sexually active.\par  \par  Of note, he has sinus issues and had a nosebleed recently, which was very prolonged and required urgent care visit (2/2 plavix), however resolved on its own once he arrived at urgent care.  Will address with his cardiologist next week. \par  -------------------------------------------------------------------------------------------------------\par  05/30/2023: s/p Circumcision\par  Findings: extremely phimotic foreskin with lichenoid appearance, glans was pale/discolored c/w lichen sclerosis, meatus was stenotic as well, significant skin bridges/adhesions between the foreskin and glans\par  -------------------------------------------------------------------------------------------------------\par  Interval History 07/14/2023:todd  Presents today for post op visit.  Last seen in clinic on 06/14/2023. He is now 6 weeks out from above procedure. No interval changes in health.  \par  \par  At his last visit, he still had significant penile swelling.  Wrapped penis for 1 week as previously instructed. Denied erythema/purulent drainage from surgical site.  Only other complaint was occasional spraying/feeling of pressure at the tip when he voids. \par  \par  On exam, he had moderate edema, evidence of new skin bridge formation and meatal stenosis. \par  \par  Today, he states that his penis looks better.  States that left side is still a little "puffy/funky looking."  \par  \par  No longer having pain with voiding, but still experiencing spraying/crooked stream. \par  ---------------------------------------------------------------------------------------------------------------------------------------\par  PMHx: CAD, Mild Obesity, Hypertension, hyperlipidemia, history of MI (~2015) s/p PCI w/ Stents\par  PSHx: PCI w/ Stent (8y ago) still on Plavix, L TKR  \par  SHx: Denies x3, retired - used to do various jobs within construction (plumbing, electric, [employer], etc.), \par  FHx: No known FHx  malignancy\par  \par  All: NKDA\par  \par  All non-urologic issues on ROS being addressed by PMD and caridologist.\par  ---------------------------------------------------------------------------------------------------------------------------------------\par  Physical Exam:\par  General: NAD, sitting on exam table comfortably\par  HEENT: NCAT, EOMI\par  Resp: breathing comfortably on RA, b/l symm chest rise\par  Cardiac: RRR\par  Abd: SNTND\par  Back: (-) CVAT b/l\par  MSK: CORDOVA w/ FROM\par  Psych: appropriate affect\par  : uncircumcised phallus with very tight foreskin c/w phimosis, pale discoloration of foreskin c/f LS, b/l desc testes with no palpable masses or lesions\par  \par   (2/13/23): uncircumcised phallus with very tight foreskin c/w phimosis, prominent SP fat pad, exam unchanged from prior\par   (6/14/23): edematous shaft, circumcision site healing well, evidence of new skin bridge forming between the glans and the more proximal skin, very stenotic meatus, no erythema or purulent drainage appreciated. \par   (7/14/23): edema about 90% resolved, prominent escutcheon pushing proximal penile skin more distally, circumcision site healing well, incision c/d/i, evidence of additional skin bridge forming between the glans and the more proximal skin on left and right sides, very stenotic meatus, no erythema or purulent drainage appreciated. \par  ---------------------------------------------------------------------------------------------------------------------------------------\par  Results:\par  Pathology\par  Foreskin 5/30/23 - Lichen\par  ---------------------------------------------------------------------------------------------------------------------------------------\par  A/P: 67-year-old male with phimosis secondary to lichen sclerosis, BPH, and erectile dysfunction. He is s/p circumcision 5/30/2023.  He has meatal stenosis that was first noted at the time of surgery.  Still bothered by the spraying.  \par  \par  Pathology results were discussed today.\par  \par  1. Phimosis and Lichen Sclerosis\par  Now s/p circ with edema mostly resolved.  We discussed that he has a very prominent SP fat pad that pushes the more proximal shaft skin distally, giving the penis a continued appearance of being uncircumcised. The glans is stil fully and easily exposed.  We discussed that the inflamed/irritated skin after surgery likely re-adhesed due to the SP fat pad, which caused the new skin bridge/adhesion formation. \par  \par  Overall, I am still very pleased with the progress he has made thus far in terms of healing.  I explained that in the future, the skin that is currently covering his glans may become scarred again, if the penis is hidden for prolonged periods of time, and if there is urine trapping underneath that would lead to further inflammation.  In order to decrease that risk, I will prescribe betamethatsone today, and will have him apply it to the glans and proximal skin for the next 4 weeks.  \par  \par  2. Meatal Stenosis\par  Will discuss RUG vs straight to OR for meatoplasty\par  \par  3. BPH\par  He continues to be only minimally bothered by his BPH, and therefore would like to hold off on tamsulosin.  \par  \par  4.  Erectile dysfunction\par  Currently not interested in treating his erectile dysfunction.  He will let me know in the future if anything changes.\par  \par  Al questions were answered on today's visit.  I will see him again in 6 weeks or sooner PRN\par  \par  Plan:\par  - start betamethasone\par  - RTC 6 weeks or sooner PRN

## 2023-09-15 ENCOUNTER — APPOINTMENT (OUTPATIENT)
Dept: UROLOGY | Facility: CLINIC | Age: 68
End: 2023-09-15
Payer: MEDICARE

## 2023-09-15 VITALS
BODY MASS INDEX: 37.35 KG/M2 | WEIGHT: 238 LBS | SYSTOLIC BLOOD PRESSURE: 150 MMHG | HEIGHT: 67 IN | DIASTOLIC BLOOD PRESSURE: 100 MMHG

## 2023-09-15 DIAGNOSIS — N35.919 UNSPECIFIED URETHRAL STRICTURE, MALE, UNSPECIFIED SITE: ICD-10-CM

## 2023-09-15 PROCEDURE — 53450 REVISION OF URETHRA: CPT

## 2023-09-18 PROBLEM — N35.919 URETHRAL MEATAL STENOSIS: Status: ACTIVE | Noted: 2023-09-18

## 2023-10-13 ENCOUNTER — APPOINTMENT (OUTPATIENT)
Dept: UROLOGY | Facility: CLINIC | Age: 68
End: 2023-10-13
Payer: MEDICARE

## 2023-10-13 VITALS — HEIGHT: 67 IN | DIASTOLIC BLOOD PRESSURE: 92 MMHG | SYSTOLIC BLOOD PRESSURE: 150 MMHG

## 2023-10-13 PROCEDURE — 99213 OFFICE O/P EST LOW 20 MIN: CPT | Mod: 24

## 2024-01-19 ENCOUNTER — APPOINTMENT (OUTPATIENT)
Dept: UROLOGY | Facility: CLINIC | Age: 69
End: 2024-01-19

## 2024-09-24 NOTE — REVIEW OF SYSTEMS
[see HPI] : see HPI [Difficulty Walking] : difficulty walking [Easy Bleeding] : a tendency for easy bleeding [Negative] : End[facility]

## (undated) DEVICE — SLV COMPRESSION KNEE MED

## (undated) DEVICE — DRAPE TOWEL BLUE 17" X 24"

## (undated) DEVICE — SUPP ATHLETIC MALE XLG 44-55IN

## (undated) DEVICE — SUT CHROMIC 4-0 27" SH-1

## (undated) DEVICE — MARKING PEN W RULER

## (undated) DEVICE — DRSG GAUZE MOISTURIZER 0.5 OZ 4X8

## (undated) DEVICE — WARMING BLANKET UPPER ADULT

## (undated) DEVICE — DRSG XEROFORM 1 X 8"

## (undated) DEVICE — GLV 7.5 PROTEXIS (WHITE)

## (undated) DEVICE — SUT CHROMIC 3-0 27" SH

## (undated) DEVICE — DRSG COBAN 1" LF NONSTERILE

## (undated) DEVICE — PACK GENERAL MINOR